# Patient Record
Sex: MALE | Race: WHITE
[De-identification: names, ages, dates, MRNs, and addresses within clinical notes are randomized per-mention and may not be internally consistent; named-entity substitution may affect disease eponyms.]

---

## 2022-03-22 PROBLEM — Z00.00 ENCOUNTER FOR PREVENTIVE HEALTH EXAMINATION: Status: ACTIVE | Noted: 2022-03-22

## 2022-03-23 ENCOUNTER — APPOINTMENT (OUTPATIENT)
Dept: ORTHOPEDIC SURGERY | Facility: CLINIC | Age: 47
End: 2022-03-23
Payer: COMMERCIAL

## 2022-03-23 VITALS
SYSTOLIC BLOOD PRESSURE: 105 MMHG | DIASTOLIC BLOOD PRESSURE: 68 MMHG | WEIGHT: 160 LBS | HEART RATE: 60 BPM | HEIGHT: 69 IN | BODY MASS INDEX: 23.7 KG/M2

## 2022-03-23 DIAGNOSIS — Z78.9 OTHER SPECIFIED HEALTH STATUS: ICD-10-CM

## 2022-03-23 DIAGNOSIS — Z87.19 PERSONAL HISTORY OF OTHER DISEASES OF THE DIGESTIVE SYSTEM: ICD-10-CM

## 2022-03-23 DIAGNOSIS — G89.29 PAIN IN RIGHT KNEE: ICD-10-CM

## 2022-03-23 DIAGNOSIS — F17.200 NICOTINE DEPENDENCE, UNSPECIFIED, UNCOMPLICATED: ICD-10-CM

## 2022-03-23 DIAGNOSIS — M25.561 PAIN IN RIGHT KNEE: ICD-10-CM

## 2022-03-23 DIAGNOSIS — M25.562 PAIN IN RIGHT KNEE: ICD-10-CM

## 2022-03-23 PROCEDURE — 73562 X-RAY EXAM OF KNEE 3: CPT | Mod: LT

## 2022-03-23 PROCEDURE — 99203 OFFICE O/P NEW LOW 30 MIN: CPT

## 2022-03-23 RX ORDER — PANTOPRAZOLE 40 MG/1
TABLET, DELAYED RELEASE ORAL
Refills: 0 | Status: ACTIVE | COMMUNITY

## 2022-03-23 NOTE — END OF VISIT
[FreeTextEntry3] : I, Soy Boogie, acted solely as a scribe for Dr. Nuno Leija on this date 03/23/2022.

## 2022-03-23 NOTE — DISCUSSION/SUMMARY
[de-identified] : 45 y/o M with mild medial compartmental osteoarthritis and patellar tendonitis of the bilateral knees. We reassured him that he is not a candidate for a TKA. He appears to be symptomatic from the tendonitis and encourage him to start with PT for muscle strengthening and to try to alleviate his symptoms. If he does not have relief with PT, he will get an MRI.

## 2022-03-23 NOTE — PHYSICAL EXAM
[LE] : Sensory: Intact in bilateral lower extremities [ALL] : dorsalis pedis, posterior tibial, femoral, popliteal, and radial 2+ and symmetric bilaterally [Normal] : Alert and in no acute distress [Poor Appearance] : well-appearing [de-identified] : GENERAL APPEARANCE: Well nourished and hydrated, pleasant, alert, and oriented x 3. Appears their stated age. \par HEENT: Normocephalic, extraocular eye motion intact. Nasal septum midline. Oral cavity clear. External auditory canal clear. \par RESPIRATORY: Breath sounds clear and audible in all lobes. No wheezing, No accessory muscle use.\par CARDIOVASCULAR: No apparent abnormalities. No lower leg edema. No varicosities. Pedal pulses are palpable.\par NEUROLOGIC: Sensation is normal, no muscle weakness in the upper or lower extremities.\par DERMATOLOGIC: No apparent skin lesions, moist, warm, no rash.\par SPINE: Cervical spine appears normal and moves freely; thoracic spine appears normal and moves freely; lumbosacral spine appears normal and moves freely, normal, nontender.\par MUSCULOSKELETAL: Hands, wrists, and elbows are normal and move freely, shoulders are normal and move freely.  [de-identified] : Bilateral knee exam shows FROM 0-130 degree, \par Right knee exam shows no medial and lateral joint line tenderness, no tenderness over tibial tubercle\par Left knee exam shows tenderness over patella tendon, no medial and lateral joint line tenderness \par  [de-identified] : 5V xray of the b/l knee done in the office today and reviewed by Dr. Nuno Leija demonstrates mild medial compartmental osteoarthritis

## 2022-03-23 NOTE — REVIEW OF SYSTEMS
[Joint Pain] : joint pain [Negative] : Heme/Lymph [Heartburn] : heartburn [Sleep Disturbances] : ~T sleep disturbances [FreeTextEntry9] : b/l knee

## 2022-03-23 NOTE — HISTORY OF PRESENT ILLNESS
[Pain Location] : pain [1] : a minimum pain level of 1/10 [5] : a current pain level of 5/10 [7] : a maximum pain level of 7/10 [___ mths] : [unfilled] month(s) ago [Walking] : worsened by walking [Rest] : relieved by rest [de-identified] : 47 y/o M presents with b/l knee pain for the past 6 months. He has pain in the patellar tendon. The pain is associated with kneeling and rise from bending. He has no pain with walking. He does not take pain medication for the knees. He was having pain many years ago and was told he had patellofemoral pain syndrome. He denies swelling. [de-identified] : kneeling

## 2023-07-28 ENCOUNTER — APPOINTMENT (OUTPATIENT)
Dept: UROLOGY | Facility: CLINIC | Age: 48
End: 2023-07-28